# Patient Record
Sex: FEMALE | Race: BLACK OR AFRICAN AMERICAN | Employment: FULL TIME | URBAN - METROPOLITAN AREA
[De-identification: names, ages, dates, MRNs, and addresses within clinical notes are randomized per-mention and may not be internally consistent; named-entity substitution may affect disease eponyms.]

---

## 2024-10-15 ENCOUNTER — OFFICE VISIT (OUTPATIENT)
Age: 44
End: 2024-10-15

## 2024-10-15 VITALS
HEART RATE: 66 BPM | RESPIRATION RATE: 18 BRPM | TEMPERATURE: 98.6 F | BODY MASS INDEX: 38.94 KG/M2 | DIASTOLIC BLOOD PRESSURE: 72 MMHG | SYSTOLIC BLOOD PRESSURE: 136 MMHG | WEIGHT: 272 LBS | HEIGHT: 70 IN | OXYGEN SATURATION: 95 %

## 2024-10-15 DIAGNOSIS — R30.0 DYSURIA: ICD-10-CM

## 2024-10-15 DIAGNOSIS — N30.00 ACUTE CYSTITIS WITHOUT HEMATURIA: Primary | ICD-10-CM

## 2024-10-15 LAB
BILIRUBIN, URINE, POC: NEGATIVE
BLOOD URINE, POC: NORMAL
GLUCOSE URINE, POC: NEGATIVE
KETONES, URINE, POC: NEGATIVE
LEUKOCYTE ESTERASE, URINE, POC: NEGATIVE
NITRITE, URINE, POC: NEGATIVE
PH, URINE, POC: 5.5 (ref 4.6–8)
PROTEIN,URINE, POC: NEGATIVE
SPECIFIC GRAVITY, URINE, POC: 1.02 (ref 1–1.03)
URINALYSIS CLARITY, POC: NORMAL
URINALYSIS COLOR, POC: YELLOW
UROBILINOGEN, POC: NORMAL MG/DL

## 2024-10-15 RX ORDER — DEXTROAMPHETAMINE SACCHARATE, AMPHETAMINE ASPARTATE, DEXTROAMPHETAMINE SULFATE AND AMPHETAMINE SULFATE 3.75; 3.75; 3.75; 3.75 MG/1; MG/1; MG/1; MG/1
TABLET ORAL
COMMUNITY

## 2024-10-15 RX ORDER — LORATADINE 10 MG/1
TABLET ORAL
COMMUNITY

## 2024-10-15 RX ORDER — BUSPIRONE HYDROCHLORIDE 10 MG/1
TABLET ORAL
COMMUNITY

## 2024-10-15 RX ORDER — NITROFURANTOIN 25; 75 MG/1; MG/1
100 CAPSULE ORAL 2 TIMES DAILY
COMMUNITY
Start: 2024-10-13

## 2024-10-15 RX ORDER — DEXTROAMPHETAMINE SACCHARATE, AMPHETAMINE ASPARTATE, DEXTROAMPHETAMINE SULFATE AND AMPHETAMINE SULFATE 1.25; 1.25; 1.25; 1.25 MG/1; MG/1; MG/1; MG/1
TABLET ORAL
COMMUNITY

## 2024-10-15 RX ORDER — SULFAMETHOXAZOLE/TRIMETHOPRIM 800-160 MG
1 TABLET ORAL 2 TIMES DAILY
Qty: 6 TABLET | Refills: 0 | Status: SHIPPED | OUTPATIENT
Start: 2024-10-15 | End: 2024-10-18

## 2024-10-15 RX ORDER — CLINDAMYCIN PHOSPHATE 10 UG/ML
LOTION TOPICAL
COMMUNITY
Start: 2024-08-22

## 2024-10-15 ASSESSMENT — ENCOUNTER SYMPTOMS: BACK PAIN: 1

## 2024-10-15 NOTE — PROGRESS NOTES
10/15/2024   Mary Ann Ross (: 1980) is a 43 y.o. female, New patient, here for evaluation of the following chief complaint(s):  Urinary Tract Infection (Pt c/o taking a UTI at home test on Saturday. Abdominal pain, low level cramps, fatigue, and feeling clammy.)     ASSESSMENT/PLAN:  Below is the assessment and plan developed based on review of pertinent history, physical exam, labs, studies, and medications.  1. Acute cystitis without hematuria  -     Culture, Urine; Future    - Bactrim    Will switch to Bactrim for UTI symptoms and send Culture.     Handout given with care instructions  2. OTC for symptom management. Increase fluid intake, ensure adequate nutritional intake.  3. Follow up with PCP as needed.  4. Go to ED with development of any acute symptoms.     Follow up:  Return if symptoms worsen or fail to improve.  Follow up immediately for any new, worsening or changes or if symptoms are not improving over the next 5-7 days.     SUBJECTIVE/OBJECTIVE:    Urinary Tract Infection         There are no diagnoses linked to this encounter.    Urinary Tract Infection (Pt c/o taking a UTI at home test on Saturday. Abdominal pain, low level cramps, fatigue, and feeling clammy.)    Started Saturday on Nitrofurantoin by [employer], not feeling any better.  Some pelvic pain, back pain.  No fevers but felt clammy.    No results found for any visits on 10/15/24.    No results found for this visit on 10/15/24.  XR Results (most recent):  @[facility]([id number])@         Review of Systems   Constitutional:  Positive for chills. Negative for fatigue and fever.   Genitourinary:  Positive for dysuria, frequency and pelvic pain.   Musculoskeletal:  Positive for back pain.         Physical Exam  Constitutional:       Appearance: Normal appearance.   Cardiovascular:      Rate and Rhythm: Normal rate and regular rhythm.      Pulses: Normal pulses.      Heart sounds: Normal heart sounds.   Pulmonary:      Effort:

## 2024-10-16 LAB
BACTERIA SPEC CULT: NORMAL
SERVICE CMNT-IMP: NORMAL

## 2025-01-17 ENCOUNTER — TELEPHONE (OUTPATIENT)
Age: 45
End: 2025-01-17

## 2025-01-17 NOTE — TELEPHONE ENCOUNTER
----- Message from Khanh XIAO sent at 1/16/2025  2:49 PM EST -----  Regarding: [facility] Appointment Request  [facility] Appointment Request    Patient needs appointment for [facility] Appointment Type: Annual Visit. physical    Patient Requested Dates(s): next week  Patient Requested Time: any time  Provider Name: Saige Ray MD    Reason for Appointment Request: Established Patient - Available appointments did not meet patient need  --------------------------------------------------------------------------------------------------------------------------    Relationship to Patient: Self     Call Back Information: OK to leave message on voicemail  Preferred Call Back Number: Phone : [phone]

## 2025-01-20 NOTE — TELEPHONE ENCOUNTER
Left pt a detailed message informing her that 's next CPE Appt is not until early May,and if she wanted to get scheduled to please callback.